# Patient Record
Sex: FEMALE | Race: WHITE | NOT HISPANIC OR LATINO | Employment: UNEMPLOYED | URBAN - METROPOLITAN AREA
[De-identification: names, ages, dates, MRNs, and addresses within clinical notes are randomized per-mention and may not be internally consistent; named-entity substitution may affect disease eponyms.]

---

## 2023-11-25 ENCOUNTER — HOSPITAL ENCOUNTER (EMERGENCY)
Facility: HOSPITAL | Age: 23
Discharge: HOME | End: 2023-11-26
Attending: EMERGENCY MEDICINE | Admitting: EMERGENCY MEDICINE
Payer: COMMERCIAL

## 2023-11-25 DIAGNOSIS — F10.920 ALCOHOLIC INTOXICATION WITHOUT COMPLICATION (H): ICD-10-CM

## 2023-11-25 DIAGNOSIS — F14.90 COCAINE USE: ICD-10-CM

## 2023-11-25 LAB
AMPHETAMINES UR QL SCN: ABNORMAL
ANION GAP SERPL CALCULATED.3IONS-SCNC: 18 MMOL/L (ref 7–15)
BARBITURATES UR QL SCN: ABNORMAL
BASOPHILS # BLD AUTO: 0.1 10E3/UL (ref 0–0.2)
BASOPHILS NFR BLD AUTO: 1 %
BENZODIAZ UR QL SCN: ABNORMAL
BUN SERPL-MCNC: 7.7 MG/DL (ref 6–20)
BZE UR QL SCN: ABNORMAL
CALCIUM SERPL-MCNC: 9.8 MG/DL (ref 8.6–10)
CANNABINOIDS UR QL SCN: ABNORMAL
CHLORIDE SERPL-SCNC: 102 MMOL/L (ref 98–107)
CREAT SERPL-MCNC: 0.8 MG/DL (ref 0.51–0.95)
DEPRECATED HCO3 PLAS-SCNC: 22 MMOL/L (ref 22–29)
EGFRCR SERPLBLD CKD-EPI 2021: >90 ML/MIN/1.73M2
EOSINOPHIL # BLD AUTO: 0.1 10E3/UL (ref 0–0.7)
EOSINOPHIL NFR BLD AUTO: 1 %
ERYTHROCYTE [DISTWIDTH] IN BLOOD BY AUTOMATED COUNT: 12.7 % (ref 10–15)
ETHANOL SERPL-MCNC: 0.29 G/DL
FENTANYL UR QL: ABNORMAL
GLUCOSE SERPL-MCNC: 122 MG/DL (ref 70–99)
HCG SERPL QL: NEGATIVE
HCT VFR BLD AUTO: 43.6 % (ref 35–47)
HGB BLD-MCNC: 14.8 G/DL (ref 11.7–15.7)
HOLD SPECIMEN: NORMAL
IMM GRANULOCYTES # BLD: 0 10E3/UL
IMM GRANULOCYTES NFR BLD: 0 %
LYMPHOCYTES # BLD AUTO: 2.6 10E3/UL (ref 0.8–5.3)
LYMPHOCYTES NFR BLD AUTO: 28 %
MCH RBC QN AUTO: 29.1 PG (ref 26.5–33)
MCHC RBC AUTO-ENTMCNC: 33.9 G/DL (ref 31.5–36.5)
MCV RBC AUTO: 86 FL (ref 78–100)
MONOCYTES # BLD AUTO: 0.5 10E3/UL (ref 0–1.3)
MONOCYTES NFR BLD AUTO: 6 %
NEUTROPHILS # BLD AUTO: 5.9 10E3/UL (ref 1.6–8.3)
NEUTROPHILS NFR BLD AUTO: 64 %
NRBC # BLD AUTO: 0 10E3/UL
NRBC BLD AUTO-RTO: 0 /100
OPIATES UR QL SCN: ABNORMAL
PCP QUAL URINE (ROCHE): ABNORMAL
PLATELET # BLD AUTO: 305 10E3/UL (ref 150–450)
POTASSIUM SERPL-SCNC: 3.4 MMOL/L (ref 3.4–5.3)
RBC # BLD AUTO: 5.08 10E6/UL (ref 3.8–5.2)
SODIUM SERPL-SCNC: 142 MMOL/L (ref 135–145)
WBC # BLD AUTO: 9.2 10E3/UL (ref 4–11)

## 2023-11-25 PROCEDURE — 99284 EMERGENCY DEPT VISIT MOD MDM: CPT | Mod: 25

## 2023-11-25 PROCEDURE — 258N000003 HC RX IP 258 OP 636: Performed by: EMERGENCY MEDICINE

## 2023-11-25 PROCEDURE — 85025 COMPLETE CBC W/AUTO DIFF WBC: CPT | Performed by: EMERGENCY MEDICINE

## 2023-11-25 PROCEDURE — 36415 COLL VENOUS BLD VENIPUNCTURE: CPT | Performed by: EMERGENCY MEDICINE

## 2023-11-25 PROCEDURE — 80048 BASIC METABOLIC PNL TOTAL CA: CPT | Performed by: EMERGENCY MEDICINE

## 2023-11-25 PROCEDURE — 80307 DRUG TEST PRSMV CHEM ANLYZR: CPT | Performed by: EMERGENCY MEDICINE

## 2023-11-25 PROCEDURE — 93005 ELECTROCARDIOGRAM TRACING: CPT | Performed by: EMERGENCY MEDICINE

## 2023-11-25 PROCEDURE — 96361 HYDRATE IV INFUSION ADD-ON: CPT

## 2023-11-25 PROCEDURE — 96360 HYDRATION IV INFUSION INIT: CPT

## 2023-11-25 PROCEDURE — 84703 CHORIONIC GONADOTROPIN ASSAY: CPT | Performed by: EMERGENCY MEDICINE

## 2023-11-25 PROCEDURE — 82077 ASSAY SPEC XCP UR&BREATH IA: CPT | Performed by: EMERGENCY MEDICINE

## 2023-11-25 RX ADMIN — SODIUM CHLORIDE 1000 ML: 9 INJECTION, SOLUTION INTRAVENOUS at 23:27

## 2023-11-25 ASSESSMENT — ENCOUNTER SYMPTOMS
FEVER: 0
ABDOMINAL PAIN: 0
VOMITING: 0
COUGH: 0
SHORTNESS OF BREATH: 1

## 2023-11-25 ASSESSMENT — ACTIVITIES OF DAILY LIVING (ADL): ADLS_ACUITY_SCORE: 35

## 2023-11-26 VITALS
HEART RATE: 111 BPM | OXYGEN SATURATION: 100 % | HEIGHT: 69 IN | SYSTOLIC BLOOD PRESSURE: 130 MMHG | RESPIRATION RATE: 20 BRPM | WEIGHT: 211 LBS | BODY MASS INDEX: 31.25 KG/M2 | DIASTOLIC BLOOD PRESSURE: 87 MMHG | TEMPERATURE: 97.7 F

## 2023-11-26 ASSESSMENT — ACTIVITIES OF DAILY LIVING (ADL): ADLS_ACUITY_SCORE: 35

## 2023-11-26 NOTE — DISCHARGE INSTRUCTIONS
Drink in moderation.  Abstain from drugs such as cocaine.    At this time I suspect that your symptoms you are feeling when he first arrived were due to the combination of alcohol and cocaine use.  Everything otherwise looks good at this time.    Return to emergency department if you develop chest pain, difficulty breathing, confusion, or any other concerns.    Thank you for choosing [facility] Emergency Department.  It has been my pleasure caring for you today.     ~Dr. René MD

## 2023-11-26 NOTE — ED NOTES
Pt is very tearful and anxious in room, writer reassured her she is going to be ok. Writer straight cathed pt for urine sample.  and friend at bedside.

## 2023-11-26 NOTE — ED TRIAGE NOTES
Patient arrives with .   asked for help removing patient from vehicle.    states patient had several drinks with friend tonight and called him saying she also took crack cocaine.     Patient minimally responsive being removed form vehicle, however, responded to sternal rub and came to by the time she arrived to room.   Crying and hyperventilating in room.   Provider at bedside.

## 2023-11-26 NOTE — ED PROVIDER NOTES
EMERGENCY DEPARTMENT ENCOUNTER      NAME: Ramya Cross  AGE: 23 year old female  YOB: 2000  MRN: [id number]  EVALUATION DATE & TIME: 2023 10:32 PM    PCP: No primary care provider on file.    ED PROVIDER: Jannette Merritt M.D.        Chief Complaint   Patient presents with    Drug Overdose         FINAL IMPRESSION:    1. Alcoholic intoxication without complication (H24)    2. Cocaine use            MEDICAL DECISION MAKIN year old female with reported history of alcohol and cocaine use who presents emergency department after combine these medications.  She had gone out drinking with friends and a friend called the  states that she was going to sleepover at a friend's house.  He asked to speak to her and just felt that she was not acting her normal self.  He was concerned and brought her to the emergency department after he saw her.  Here in the ER she admits to cocaine use and initially declined that she had used any alcohol but alcohol is positive.  She has now sobered up and is feeling much better.  Significantly improved on exam.  Able to ambulate in the ER without difficulty and is stable on her feet.  Neuro exam unremarkable.  Laboratories reassuring.  She will be discharged home with .  He agrees with this plan and feels comfortable with this.      ED COURSE:  10:30 PM  I met with the patient to gather history and perform my exam. ED course and treatment discussed.    11:55 PM  Updated patient,  at bedside, and other  on current results including positive alcohol level and cocaine.  Heart rate has improved.  Patient much more appropriate and cooperative.  She is awake and oriented.  No signs for respiratory depression or altered mental status at this time.  We will continue to monitor as I suspect most of her symptoms are substance related.    1:38 AM  Patient looks significantly better.  She feels much better and wants to go home.  She was able  to ambulate in the ER without any difficulty and shows that she is stable on her feet.   present at bedside and feels comfortable taking her home.  At this time I think her symptoms are due to a combination of alcohol and cocaine use.  No concerns for intentional overdose, suicidal intent or gesture.  I do not think she requires emergent psychiatric evaluation.  I do not think this represents a true overdose, it was just that she combine these medications and never done that before and this concerned the .    She complained of feeling tingly all over and little lightheaded but I think that was all consistent with her hyperventilation when she presented in what I witnessed with her.  Vital signs have otherwise been quite stable and have now normalized as she has sobered up.    At the conclusion of the encounter I discussed the results of all of the tests and the disposition. Their questions were answered. The patient (and any family present) acknowledged understanding and were agreeable with the care plan.      CONSULTANTS:  none        MEDICATIONS GIVEN IN THE EMERGENCY:  Medications   sodium chloride 0.9% BOLUS 1,000 mL (1,000 mLs Intravenous $New Bag 11/25/23 5592)           NEW PRESCRIPTIONS STARTED AT TODAY'S ER VISIT     Medication List      There are no discharge medications for this visit.             CONDITION:  stable        DISPOSITION:  Discharge home with          =================================================================  =================================================================  TRIAGE ASSESSMENT:  Patient arrives with .   asked for help removing patient from vehicle.    states patient had several drinks with friend tonight and called him saying she also took crack cocaine.     Patient minimally responsive being removed form vehicle, however, responded to sternal rub and came to by the time she arrived to room.   Crying and hyperventilating in  room.   Provider at bedside.         ED Triage Vitals [11/25/23 8379]   Enc Vitals Group      /74      Pulse (!) 135      Resp (!) 34      Temp       Temp src       SpO2 100 %          ================================================================  ================================================================    HPI    Patient information was obtained from: patient and     Use of Intrepreter: N/A      Ramya Cross is a 23 year old female with no significant past medical history per  who presents to the ER with complaints of tachycardia.     states that she reportedly went out drinking with friends tonight.  She received a call from a friend stating that the patient was going to spend the night at a friend's house.   asked to speak to the patient.  He states that she did not sound drunk but when she got home was noted to be very tachycardic and not feeling well.  She admitted to him that she had done crack.    Patient at this time complains of her heart racing, feeling like she is having difficulty breathing and feeling tingly all over.  She states that she did not have any alcohol but does admit to crack use.        REVIEW OF SYSTEMS  Review of Systems   Constitutional:  Negative for fever.   Respiratory:  Positive for shortness of breath. Negative for cough.    Cardiovascular:  Negative for chest pain.   Gastrointestinal:  Negative for abdominal pain and vomiting.   All other systems reviewed and are negative.          PAST MEDICAL HISTORY:  History reviewed. No pertinent past medical history.      PAST SURGICAL HISTORY:  History reviewed. No pertinent surgical history.      CURRENT MEDICATIONS:    Prior to Admission medications    Not on File         ALLERGIES:  No Known Allergies      FAMILY HISTORY:  History reviewed. No pertinent family history.      SOCIAL HISTORY:  Social History     Socioeconomic History    Marital status: Single         VITALS:  Patient Vitals for  "the past 24 hrs:   BP Temp Temp src Pulse Resp SpO2 Height Weight   11/25/23 2345 -- -- -- 98 -- -- -- --   11/25/23 2244 -- 97.7  F (36.5  C) Oral (!) 145 (!) 44 100 % -- --   11/25/23 2237 -- -- -- -- -- -- 1.753 m (5' 9\") 95.7 kg (211 lb)   11/25/23 2235 125/74 -- -- (!) 135 (!) 34 100 % -- --       Wt Readings from Last 3 Encounters:   11/25/23 95.7 kg (211 lb)       Estimated Creatinine Clearance: 133.5 mL/min (based on SCr of 0.8 mg/dL).    PHYSICAL EXAM    Constitutional:  Well developed, Well nourished, crying and very anxious, GCS 15  HENT:  Normocephalic, Atraumatic, Bilateral external ears normal, Nose normal. Neck- Supple, No stridor.   Eyes:  PERRL, EOMI, Conjunctiva normal, No discharge.  Respiratory:  Normal breath sounds, No respiratory distress, No wheezing, Speaks full sentences easily. No cough.   Cardiovascular:  Tachy heart rate, Regular rhythm, No rubs, No gallops. Chest wall nontender.   GI:  No excessive obesity.  Bowel sounds normal, Soft, No tenderness, No masses, No flank tenderness. No rebound or guarding.   : deferred  Musculoskeletal: 2+ DP pulses. No edema.  No cyanosis, No clubbing. Good range of motion in all major joints. No tenderness to palpation or major deformities noted. No tenderness of the CTLS spine.  Integument:  Warm, Dry, No erythema, No rash.  No petechiae.   Neurologic:  Alert & oriented x 3, Normal motor function, Normal sensory function, No focal deficits noted.   Psychiatric:  Crying and hyperventilating         LAB:  All pertinent labs reviewed and interpreted.  Recent Results (from the past 24 hour(s))   Extra Blue Top Tube    Collection Time: 11/25/23 10:39 PM   Result Value Ref Range    Hold Specimen [facility]    Basic metabolic panel    Collection Time: 11/25/23 10:40 PM   Result Value Ref Range    Sodium 142 135 - 145 mmol/L    Potassium 3.4 3.4 - 5.3 mmol/L    Chloride 102 98 - 107 mmol/L    Carbon Dioxide (CO2) 22 22 - 29 mmol/L    Anion Gap 18 (H) 7 - 15 mmol/L " "   Urea Nitrogen 7.7 6.0 - 20.0 mg/dL    Creatinine 0.80 0.51 - 0.95 mg/dL    GFR Estimate >90 >60 mL/min/1.73m2    Calcium 9.8 8.6 - 10.0 mg/dL    Glucose 122 (H) 70 - 99 mg/dL   HCG QUALitative pregnancy (blood)    Collection Time: 11/25/23 10:40 PM   Result Value Ref Range    hCG Serum Qualitative Negative Negative   Alcohol level blood    Collection Time: 11/25/23 10:40 PM   Result Value Ref Range    Alcohol ethyl 0.29 (H) <=0.01 g/dL   CBC with platelets and differential    Collection Time: 11/25/23 10:40 PM   Result Value Ref Range    WBC Count 9.2 4.0 - 11.0 10e3/uL    RBC Count 5.08 3.80 - 5.20 10e6/uL    Hemoglobin 14.8 11.7 - 15.7 g/dL    Hematocrit 43.6 35.0 - 47.0 %    MCV 86 78 - 100 fL    MCH 29.1 26.5 - 33.0 pg    MCHC 33.9 31.5 - 36.5 g/dL    RDW 12.7 10.0 - 15.0 %    Platelet Count 305 150 - 450 10e3/uL    % Neutrophils 64 %    % Lymphocytes 28 %    % Monocytes 6 %    % Eosinophils 1 %    % Basophils 1 %    % Immature Granulocytes 0 %    NRBCs per 100 WBC 0 <1 /100    Absolute Neutrophils 5.9 1.6 - 8.3 10e3/uL    Absolute Lymphocytes 2.6 0.8 - 5.3 10e3/uL    Absolute Monocytes 0.5 0.0 - 1.3 10e3/uL    Absolute Eosinophils 0.1 0.0 - 0.7 10e3/uL    Absolute Basophils 0.1 0.0 - 0.2 10e3/uL    Absolute Immature Granulocytes 0.0 <=0.4 10e3/uL    Absolute NRBCs 0.0 10e3/uL   Urine Drug Screen Panel    Collection Time: 11/25/23 11:02 PM   Result Value Ref Range    Amphetamines Urine Screen Negative Screen Negative    Barbituates Urine Screen Negative Screen Negative    Benzodiazepine Urine Screen Negative Screen Negative    Cannabinoids Urine Screen Negative Screen Negative    Cocaine Urine Screen Positive (A) Screen Negative    Fentanyl Qual Urine Screen Negative Screen Negative    Opiates Urine Screen Negative Screen Negative    PCP Urine Screen Negative Screen Negative       No results found for: \"ABORH\"        RADIOLOGY:  Reviewed all pertinent imaging. Please see official radiology report.    No " orders to display         EKG:    Indication: tachycardia    Performed at: 22:44p  Impression: Sinus tachycardia at 111 bpm.  Flipped T waves in lead III, aVF, aVR and V1.  NC interval 152 ms, QRS 94 ms, QTc 4 and 56 ms.  Nonspecific EKG.  No signs for Brugada or delta waves.  No prior EKGs to compare to.      I have independently reviewed and interpreted the EKG(s) documented above.        PROCEDURES:  none    Medical Decision Making    History:  Supplemental history from: Documented in chart, if applicable and Family Member/Significant Other  External Record(s) reviewed: Documented in chart, if applicable.    Work Up:  Chart documentation includes differential considered and any EKGs or imaging independently interpreted by provider, where specified.  In additional to work up documented, I considered the following work up: Documented in chart, if applicable.    External consultation:  Discussion of management with another provider: Documented in chart, if applicable    Complicating factors:  Care impacted by chronic illness: Other: none per   Care affected by social determinants of health: Other: alcohol and drug use    Disposition considerations: Discharge. No recommendations on prescription strength medication(s). I considered admission, but ultimately discharged patient as she has sobered up nicely without signs of withdrawal and feels much better.  Ambulatory and stable on her feet.  Neuro exam unremarkable.  No signs for acute psychiatric decompensation..      Jannette Merritt M.D. [facility]  Emergency Medicine and Medical Toxicology  [facility] EMERGENCY DEPARTMENT  [facility] [place] [facility] [address]  [phone]  Dept: [phone]           Jannette Merritt MD  11/26/23 0141

## 2023-12-05 LAB
ATRIAL RATE - MUSE: 111 BPM
DIASTOLIC BLOOD PRESSURE - MUSE: 74 MMHG
INTERPRETATION ECG - MUSE: NORMAL
P AXIS - MUSE: 54 DEGREES
PR INTERVAL - MUSE: 152 MS
QRS DURATION - MUSE: 94 MS
QT - MUSE: 336 MS
QTC - MUSE: 456 MS
R AXIS - MUSE: 70 DEGREES
SYSTOLIC BLOOD PRESSURE - MUSE: 125 MMHG
T AXIS - MUSE: 35 DEGREES
VENTRICULAR RATE- MUSE: 111 BPM